# Patient Record
Sex: FEMALE | Race: WHITE
[De-identification: names, ages, dates, MRNs, and addresses within clinical notes are randomized per-mention and may not be internally consistent; named-entity substitution may affect disease eponyms.]

---

## 2021-01-28 ENCOUNTER — HOSPITAL ENCOUNTER (EMERGENCY)
Dept: HOSPITAL 54 - ER | Age: 36
Discharge: HOME | End: 2021-01-28
Payer: COMMERCIAL

## 2021-01-28 VITALS — HEIGHT: 64 IN | WEIGHT: 293 LBS | BODY MASS INDEX: 50.02 KG/M2

## 2021-01-28 VITALS — DIASTOLIC BLOOD PRESSURE: 78 MMHG | SYSTOLIC BLOOD PRESSURE: 121 MMHG

## 2021-01-28 DIAGNOSIS — F41.9: ICD-10-CM

## 2021-01-28 DIAGNOSIS — R07.89: Primary | ICD-10-CM

## 2021-01-28 DIAGNOSIS — E66.01: ICD-10-CM

## 2021-01-28 LAB
ALBUMIN SERPL BCP-MCNC: 3.3 G/DL (ref 3.4–5)
ALP SERPL-CCNC: 64 U/L (ref 46–116)
ALT SERPL W P-5'-P-CCNC: 52 U/L (ref 12–78)
AST SERPL W P-5'-P-CCNC: 34 U/L (ref 15–37)
BASOPHILS # BLD AUTO: 0.1 /CMM (ref 0–0.2)
BASOPHILS NFR BLD AUTO: 0.6 % (ref 0–2)
BILIRUB DIRECT SERPL-MCNC: 0.1 MG/DL (ref 0–0.2)
BILIRUB SERPL-MCNC: 0.3 MG/DL (ref 0.2–1)
BUN SERPL-MCNC: 8 MG/DL (ref 7–18)
CALCIUM SERPL-MCNC: 8.8 MG/DL (ref 8.5–10.1)
CHLORIDE SERPL-SCNC: 100 MMOL/L (ref 98–107)
CO2 SERPL-SCNC: 28 MMOL/L (ref 21–32)
CREAT SERPL-MCNC: 1 MG/DL (ref 0.6–1.3)
EOSINOPHIL NFR BLD AUTO: 0.9 % (ref 0–6)
GLUCOSE SERPL-MCNC: 276 MG/DL (ref 74–106)
HCT VFR BLD AUTO: 45 % (ref 33–45)
HGB BLD-MCNC: 14.8 G/DL (ref 11.5–14.8)
LYMPHOCYTES NFR BLD AUTO: 1.4 /CMM (ref 0.8–4.8)
LYMPHOCYTES NFR BLD AUTO: 13 % (ref 20–44)
MCHC RBC AUTO-ENTMCNC: 33 G/DL (ref 31–36)
MCV RBC AUTO: 88 FL (ref 82–100)
MONOCYTES NFR BLD AUTO: 0.8 /CMM (ref 0.1–1.3)
MONOCYTES NFR BLD AUTO: 7.3 % (ref 2–12)
NEUTROPHILS # BLD AUTO: 8.5 /CMM (ref 1.8–8.9)
NEUTROPHILS NFR BLD AUTO: 78.2 % (ref 43–81)
PLATELET # BLD AUTO: 363 /CMM (ref 150–450)
POTASSIUM SERPL-SCNC: 4 MMOL/L (ref 3.5–5.1)
PROT SERPL-MCNC: 7.3 G/DL (ref 6.4–8.2)
RBC # BLD AUTO: 5.06 MIL/UL (ref 4–5.2)
SODIUM SERPL-SCNC: 136 MMOL/L (ref 136–145)
WBC NRBC COR # BLD AUTO: 10.8 K/UL (ref 4.3–11)

## 2021-01-28 RX ADMIN — METFORMIN HYDROCHLORIDE ONE MG: 500 TABLET, FILM COATED ORAL at 17:47

## 2021-01-28 NOTE — NUR
REPORT GIVEN TO LESLY POSADAS SUP FOR PT RETURN BACK TO THE FACILITY. [employer] 
INCIDENT REPORT WAS ALSO RELAYED TO [place].

## 2021-01-28 NOTE — NUR
CALL FROM OFFICER BULMARO SAYING THAT PATIENT IS A MISSING PERSON IN [facility] AND HE WANT TO BE CALLED [phone] IF HE IS BEING DISCHARGED

## 2021-01-28 NOTE — NUR
PT IS MEDICALLY CLEARED TO GO BACK TO [facility] FOR CONTINUATION OF HER 
ADMISSION THERE. REPORT WAS GIVEN TO LESLY POSADAS SUP AT THE [facility]. 
[facility]A#[phone] AT BEDSIDE FOR PT TRANSPORT TO [facility]. DILCIA JEAN W/ 2 
EMT

## 2021-01-28 NOTE — NUR
DINESH FROM [facility], TO ER BED 12. AAOX4. NOT IN RESP DISTRESS. 
AMBULATORY. BROUGHT IN FOR MID STERNAL CHEST PAIN X 30MIN PTA. 10/10 PRESSURE 
DEEP SENSATION, NON RADIATING. PT IS ADMITTIED VOLUTARILY TO [facility]AN[facility]ARD FOR 
ANXIETY. PT IS NOTED TO BE ANXIOUS AS WELL. PT IS ON MONITOR. AWAITING MD FOR 
EVAL.